# Patient Record
Sex: FEMALE | Race: WHITE | NOT HISPANIC OR LATINO | Employment: UNEMPLOYED | RURAL
[De-identification: names, ages, dates, MRNs, and addresses within clinical notes are randomized per-mention and may not be internally consistent; named-entity substitution may affect disease eponyms.]

---

## 2017-05-30 ENCOUNTER — OFFICE VISIT (OUTPATIENT)
Dept: CARDIOLOGY | Facility: CLINIC | Age: 82
End: 2017-05-30

## 2017-05-30 VITALS
WEIGHT: 190 LBS | BODY MASS INDEX: 34.96 KG/M2 | HEART RATE: 70 BPM | DIASTOLIC BLOOD PRESSURE: 76 MMHG | SYSTOLIC BLOOD PRESSURE: 127 MMHG | HEIGHT: 62 IN

## 2017-05-30 DIAGNOSIS — I48.21 PERMANENT ATRIAL FIBRILLATION (HCC): ICD-10-CM

## 2017-05-30 DIAGNOSIS — I20.9 ANGINA PECTORIS (HCC): Primary | ICD-10-CM

## 2017-05-30 DIAGNOSIS — I10 ESSENTIAL HYPERTENSION: ICD-10-CM

## 2017-05-30 DIAGNOSIS — I49.5 SSS (SICK SINUS SYNDROME) (HCC): ICD-10-CM

## 2017-05-30 PROCEDURE — 93288 INTERROG EVL PM/LDLS PM IP: CPT | Performed by: INTERNAL MEDICINE

## 2017-05-30 PROCEDURE — 99214 OFFICE O/P EST MOD 30 MIN: CPT | Performed by: INTERNAL MEDICINE

## 2017-05-30 RX ORDER — METOPROLOL SUCCINATE 25 MG/1
25 TABLET, EXTENDED RELEASE ORAL DAILY
Qty: 90 TABLET | Refills: 3 | Status: SHIPPED | OUTPATIENT
Start: 2017-05-30 | End: 2018-08-21

## 2017-05-30 RX ORDER — ISOSORBIDE MONONITRATE 30 MG/1
30 TABLET, EXTENDED RELEASE ORAL DAILY
Qty: 90 TABLET | Refills: 3 | Status: SHIPPED | OUTPATIENT
Start: 2017-05-30

## 2017-05-30 RX ORDER — FUROSEMIDE 20 MG/1
20 TABLET ORAL DAILY
Qty: 90 TABLET | Refills: 3 | Status: SHIPPED | OUTPATIENT
Start: 2017-05-30

## 2017-05-30 RX ORDER — DILTIAZEM HYDROCHLORIDE 120 MG/1
120 TABLET, FILM COATED ORAL DAILY
Qty: 90 TABLET | Refills: 3 | Status: SHIPPED | OUTPATIENT
Start: 2017-05-30

## 2017-09-05 ENCOUNTER — OFFICE VISIT (OUTPATIENT)
Dept: CARDIOLOGY | Facility: CLINIC | Age: 82
End: 2017-09-05

## 2017-09-05 VITALS
HEIGHT: 62 IN | SYSTOLIC BLOOD PRESSURE: 122 MMHG | DIASTOLIC BLOOD PRESSURE: 74 MMHG | HEART RATE: 63 BPM | WEIGHT: 201 LBS | BODY MASS INDEX: 36.99 KG/M2

## 2017-09-05 DIAGNOSIS — I10 ESSENTIAL HYPERTENSION: ICD-10-CM

## 2017-09-05 DIAGNOSIS — I49.5 SSS (SICK SINUS SYNDROME) (HCC): Primary | ICD-10-CM

## 2017-09-05 PROCEDURE — 99213 OFFICE O/P EST LOW 20 MIN: CPT | Performed by: INTERNAL MEDICINE

## 2017-09-05 NOTE — PROGRESS NOTES
Encounter Date:09/05/2017      Patient ID: Jennifer Cline is a 89 y.o. female.    Chief Complaint: Atrial Fibrillation    PROBLEM LIST:  1. Probable anginal pectoris:  a. Reported history of previous negative cardiac catheterization, incomplete database.  b. Off-and-on chest discomfort, fairly stable on current dose of Imdur.  c. Refuses further cardiac evaluation at this time, 03/13/2012.  d. Echocardiogram, 01/12/2012, was technically a limited study and showed mild LV dysfunction. Accurate assessment could not be made.  e. Symptoms improved after discontinuation of carbonated beverages.  2. Sick sinus syndrome:  a. History of resting bradycardia.  b. Subsequent rapid atrial fibrillation.  c. Transesophageal echocardiogram followed by electrical cardioversion by Dr. Tee, 03/05/2008.  d. Subsequent junctional bradycardia.  e. Dual-chamber St. Saji pacemaker implant, 03/05/2008.  f. Recent hospitalization with rapid atrial fibrillation and congestive heart failure.  g. Coumadin discontinued due to frequent falls and extensive facial trauma and hematoma.  3. Hypertension.  4. Peptic ulcer disease/gastroesophageal reflux disease.  5. Probable history of angina.  6. Medical noncompliance.  7. Obesity, BMI 34.    History of Present Illness  Patient presents today for follow-up with a history of angina pectoris, SSS and cardiac risk factors. Doing well from a cardiac standpoint. Relative notes that she sleeps more often than usual but has no limitations of activities when awake. Denies chest pain, shortness of breath, leg swelling, palpitations, and syncope. Remains busy and active with her daily activities and no limitations.    Allergies   Allergen Reactions   • Excedrin Back & [Acetaminophen-Aspirin Buffered]          Current Outpatient Prescriptions:   •  citalopram (CeleXA) 20 MG tablet, Take 20 mg by mouth Daily., Disp: , Rfl:   •  diltiaZEM (CARDIZEM) 120 MG tablet, Take 1 tablet by mouth Daily.,  "Disp: 90 tablet, Rfl: 3  •  furosemide (LASIX) 20 MG tablet, Take 1 tablet by mouth Daily., Disp: 90 tablet, Rfl: 3  •  isosorbide mononitrate (IMDUR) 30 MG 24 hr tablet, Take 1 tablet by mouth Daily., Disp: 90 tablet, Rfl: 3  •  levothyroxine (SYNTHROID, LEVOTHROID) 50 MCG tablet, Take 50 mcg by mouth Daily., Disp: , Rfl:   •  metoprolol succinate XL (TOPROL-XL) 25 MG 24 hr tablet, Take 1 tablet by mouth Daily., Disp: 90 tablet, Rfl: 3    The following portions of the patient's history were reviewed and updated as appropriate: allergies, current medications, past family history, past medical history, past social history, past surgical history and problem list.    ROS  Review of Systems   Constitution: Negative for chills, fever, weight gain and weight loss.   Cardiovascular: Negative for chest pain, claudication, dyspnea on exertion, leg swelling, orthopnea, palpitations, paroxysmal nocturnal dyspnea and syncope.        No dizziness   Gastrointestinal: Negative for abdominal pain, constipation, diarrhea, nausea and vomiting.   Genitourinary:        No urinary symptoms   Neurological:        No symptoms of stroke.   All other systems reviewed and are negative.    Objective:     Blood pressure 122/74, pulse 63, height 62\" (157.5 cm), weight 201 lb (91.2 kg).      Physical Exam  Constitutional: She appears well-developed and well-nourished.   HENT:   HEENT exam unremarkable.   Neck: Neck supple. No JVD present.   No carotid bruits.   Cardiovascular: Normal rate, regular rhythm and normal heart sounds.    No murmur heard.  2 plus symmetric pulses.   Pulmonary/Chest: Breath sounds normal. Does not exhibit tenderness.   Abdominal:   Abdomen benign.   Musculoskeletal: Does not exhibit edema.   Neurological:   Neurological exam unremarkable.   Vitals reviewed.    Procedures   Device interrogation shows normal VVI pacemaker function, device has reached DYAN      Assessment:      Diagnosis Plan   1. SSS (sick sinus syndrome), " Asymptomatic, battery at end of life  Generator change    2. Essential hypertension Controlled       Plan:   Scheduled for a generator change.  Stable cardiac status.  Continue current medications.   FU After procedure.  Thank you for allowing us to participate in the care of your patient.     Fior Tee MD, FACC, [facility]        Please note that portions of this note may have been completed with a voice recognition program. Efforts were made to edit the dictations, but occasionally words are mistranscribed.

## 2017-09-08 ENCOUNTER — PREP FOR SURGERY (OUTPATIENT)
Dept: OTHER | Facility: HOSPITAL | Age: 82
End: 2017-09-08

## 2017-09-08 DIAGNOSIS — I49.5 SSS (SICK SINUS SYNDROME) (HCC): Primary | ICD-10-CM

## 2017-09-08 DIAGNOSIS — Z45.010 PACEMAKER BATTERY DEPLETION: ICD-10-CM

## 2017-09-08 DIAGNOSIS — Z45.010 PACEMAKER AT END OF BATTERY LIFE: Primary | ICD-10-CM

## 2017-09-08 RX ORDER — ONDANSETRON 2 MG/ML
4 INJECTION INTRAMUSCULAR; INTRAVENOUS EVERY 6 HOURS PRN
Status: CANCELLED | OUTPATIENT
Start: 2017-09-08

## 2017-09-08 RX ORDER — NITROGLYCERIN 0.4 MG/1
0.4 TABLET SUBLINGUAL
Status: CANCELLED | OUTPATIENT
Start: 2017-09-08

## 2017-09-08 RX ORDER — VANCOMYCIN HYDROCHLORIDE 1 G/200ML
1000 INJECTION, SOLUTION INTRAVENOUS
Status: CANCELLED | OUTPATIENT
Start: 2017-09-08

## 2017-09-08 RX ORDER — SODIUM CHLORIDE 0.9 % (FLUSH) 0.9 %
1-10 SYRINGE (ML) INJECTION AS NEEDED
Status: CANCELLED | OUTPATIENT
Start: 2017-09-08

## 2017-09-08 RX ORDER — ACETAMINOPHEN 325 MG/1
650 TABLET ORAL EVERY 4 HOURS PRN
Status: CANCELLED | OUTPATIENT
Start: 2017-09-08

## 2017-09-17 ENCOUNTER — APPOINTMENT (OUTPATIENT)
Dept: PREADMISSION TESTING | Facility: HOSPITAL | Age: 82
End: 2017-09-17

## 2017-09-17 LAB
DEPRECATED RDW RBC AUTO: 47.3 FL (ref 37–54)
ERYTHROCYTE [DISTWIDTH] IN BLOOD BY AUTOMATED COUNT: 13.8 % (ref 11.3–14.5)
HCT VFR BLD AUTO: 45.5 % (ref 34.5–44)
HGB BLD-MCNC: 14.9 G/DL (ref 11.5–15.5)
MCH RBC QN AUTO: 30.5 PG (ref 27–31)
MCHC RBC AUTO-ENTMCNC: 32.7 G/DL (ref 32–36)
MCV RBC AUTO: 93.2 FL (ref 80–99)
PLATELET # BLD AUTO: 282 10*3/MM3 (ref 150–450)
PMV BLD AUTO: 10 FL (ref 6–12)
RBC # BLD AUTO: 4.88 10*6/MM3 (ref 3.89–5.14)
WBC NRBC COR # BLD: 7.99 10*3/MM3 (ref 3.5–10.8)

## 2017-09-17 PROCEDURE — 85027 COMPLETE CBC AUTOMATED: CPT | Performed by: PHYSICIAN ASSISTANT

## 2017-09-17 NOTE — PAT
NO ORDER IN [facility] FOR CONSENT.  SIGN DOS.      GREEN TOP HEMALYZED.  PT. LEFT.  REDRAW DOS.

## 2017-09-17 NOTE — DISCHARGE INSTRUCTIONS
The following instructions given during Pre Admission Testing visit:    Do not eat or drink anything after MN except for sips of water with your a.m. Prescription meds unless otherwise instructed by your physician.    Glasses and jewelry may be worn, but dentures must be removed prior to cath/procedure.    Leave any items you consider valuable at home.    Family members may wait in [facility] waiting area during procedure.    Bring all medications in their original containers the day of procedure.    Bring photo ID and insurance cards on the day of procedure.    Need to make arrangements for transportation prior to discharge.    The following handouts were given:     Heart Cath pathway (if applicable)   Cardiac Cath booklet published by [employer]    OR appropriate [facility] procedure booklet    If applicable, pt instructed to bring CPAP mask and tubing the day of procedure.

## 2017-09-18 ENCOUNTER — HOSPITAL ENCOUNTER (OUTPATIENT)
Facility: HOSPITAL | Age: 82
Setting detail: HOSPITAL OUTPATIENT SURGERY
Discharge: HOME | End: 2017-09-18
Attending: INTERNAL MEDICINE | Admitting: INTERNAL MEDICINE

## 2017-09-18 VITALS
DIASTOLIC BLOOD PRESSURE: 73 MMHG | TEMPERATURE: 97 F | OXYGEN SATURATION: 93 % | HEART RATE: 72 BPM | RESPIRATION RATE: 18 BRPM | BODY MASS INDEX: 36.47 KG/M2 | WEIGHT: 198.19 LBS | HEIGHT: 62 IN | SYSTOLIC BLOOD PRESSURE: 141 MMHG

## 2017-09-18 DIAGNOSIS — I49.5 SSS (SICK SINUS SYNDROME) (HCC): ICD-10-CM

## 2017-09-18 DIAGNOSIS — Z45.010 PACEMAKER BATTERY DEPLETION: ICD-10-CM

## 2017-09-18 PROCEDURE — C1786 PMKR, SINGLE, RATE-RESP: HCPCS | Performed by: INTERNAL MEDICINE

## 2017-09-18 PROCEDURE — 25010000002 MIDAZOLAM PER 1 MG: Performed by: INTERNAL MEDICINE

## 2017-09-18 PROCEDURE — 33227 REMOVE&REPLACE PM GEN SINGL: CPT | Performed by: INTERNAL MEDICINE

## 2017-09-18 PROCEDURE — 25010000002 FENTANYL CITRATE (PF) 100 MCG/2ML SOLUTION: Performed by: INTERNAL MEDICINE

## 2017-09-18 PROCEDURE — 25010000002 VANCOMYCIN PER 500 MG: Performed by: PHYSICIAN ASSISTANT

## 2017-09-18 DEVICE — GEN PM ASSURITY SR RF PM1272: Type: IMPLANTABLE DEVICE | Status: FUNCTIONAL

## 2017-09-18 RX ORDER — NITROGLYCERIN 0.4 MG/1
0.4 TABLET SUBLINGUAL
Status: DISCONTINUED | OUTPATIENT
Start: 2017-09-18 | End: 2017-09-18 | Stop reason: HOSPADM

## 2017-09-18 RX ORDER — FENTANYL CITRATE 50 UG/ML
INJECTION, SOLUTION INTRAMUSCULAR; INTRAVENOUS AS NEEDED
Status: DISCONTINUED | OUTPATIENT
Start: 2017-09-18 | End: 2017-09-18 | Stop reason: HOSPADM

## 2017-09-18 RX ORDER — SODIUM CHLORIDE 0.9 % (FLUSH) 0.9 %
1-10 SYRINGE (ML) INJECTION AS NEEDED
Status: DISCONTINUED | OUTPATIENT
Start: 2017-09-18 | End: 2017-09-18 | Stop reason: HOSPADM

## 2017-09-18 RX ORDER — VANCOMYCIN HYDROCHLORIDE 1 G/200ML
1000 INJECTION, SOLUTION INTRAVENOUS
Status: COMPLETED | OUTPATIENT
Start: 2017-09-18 | End: 2017-09-18

## 2017-09-18 RX ORDER — ONDANSETRON 2 MG/ML
4 INJECTION INTRAMUSCULAR; INTRAVENOUS EVERY 6 HOURS PRN
Status: DISCONTINUED | OUTPATIENT
Start: 2017-09-18 | End: 2017-09-18 | Stop reason: HOSPADM

## 2017-09-18 RX ORDER — ACETAMINOPHEN 325 MG/1
650 TABLET ORAL EVERY 4 HOURS PRN
Status: DISCONTINUED | OUTPATIENT
Start: 2017-09-18 | End: 2017-09-18 | Stop reason: HOSPADM

## 2017-09-18 RX ORDER — MIDAZOLAM HYDROCHLORIDE 1 MG/ML
INJECTION INTRAMUSCULAR; INTRAVENOUS AS NEEDED
Status: DISCONTINUED | OUTPATIENT
Start: 2017-09-18 | End: 2017-09-18 | Stop reason: HOSPADM

## 2017-09-18 RX ORDER — LIDOCAINE HYDROCHLORIDE 10 MG/ML
INJECTION, SOLUTION INFILTRATION; PERINEURAL AS NEEDED
Status: DISCONTINUED | OUTPATIENT
Start: 2017-09-18 | End: 2017-09-18 | Stop reason: HOSPADM

## 2017-09-18 RX ADMIN — VANCOMYCIN HYDROCHLORIDE 1000 MG: 1 INJECTION, SOLUTION INTRAVENOUS at 08:32

## 2017-09-18 NOTE — PLAN OF CARE
Problem: Cardiac Rhythm Management Device (Adult)  Goal: Signs and Symptoms of Listed Potential Problems Will be Absent or Manageable (Cardiac Rhythm Management Device)  Outcome: Ongoing (interventions implemented as appropriate)    09/18/17 4052   Cardiac Rhythm Management Device   Problems Assessed (Cardiac Rhythm Management Device) all   Problems Present (Cardiac Rhythm Management Device) none

## 2017-09-18 NOTE — INTERVAL H&P NOTE
H&P reviewed. The patient was examined and there are no changes to the H&P.    Pre-cardiac Catheterization Report  Cardiovascular Laboratory  [place]    Patient:  Jennifer Cline  :  1926  PCP:  Higinio Kenny MD  PHONE:  [phone]    DATE: 2017      MEDICATIONS:  Prior to Admission medications    Medication Sig Start Date End Date Taking? Authorizing Provider   citalopram (CeleXA) 20 MG tablet Take 10 mg by mouth Daily.   Yes Historical Provider, MD   diltiaZEM (CARDIZEM) 120 MG tablet Take 1 tablet by mouth Daily. 17  Yes JACOB Sanchez   furosemide (LASIX) 20 MG tablet Take 1 tablet by mouth Daily. 17  Yes JACOB Sanchez   isosorbide mononitrate (IMDUR) 30 MG 24 hr tablet Take 1 tablet by mouth Daily. 17  Yes JACOB Sanchez   levothyroxine (SYNTHROID, LEVOTHROID) 50 MCG tablet Take 50 mcg by mouth Daily.   Yes Historical Provider, MD   metoprolol succinate XL (TOPROL-XL) 25 MG 24 hr tablet Take 1 tablet by mouth Daily. 17  Yes JACOB Sanchez       Past medical & surgical history, social and family history reviewed in the electronic medical record.    Physical Exam:    Vitals:   Vitals:    17 0650   BP: 179/83   Pulse: 65   Resp: 20   Temp: 97 °F (36.1 °C)   SpO2: 92%    Last Weight    17  0650   Weight: 198 lb 3.1 oz (89.9 kg)   Body mass index is 36.25 kg/(m^2).    GENERAL: No apparent distress.  No significant changes since last exam.  CHEST: Clear to auscultation bilaterally no stridor no wheeze.  CV: S1, S2, Regular without Murmurs, Rubs or Gallops  EXTREMITIES: No edema.              Results from last 7 days  Lab Units 17  1057   WBC 10*3/mm3 7.99   HEMOGLOBIN g/dL 14.9   HEMATOCRIT % 45.5*   PLATELETS 10*3/mm3 282     No results found for: CHLPL, TRIG, HDL, LDLDIRECT, AST, ALT        IMPRESSION:PPM at DYAN    PLAN:  · PPM Gen change     I have seen and examined the patient, case was discussed with the  physician extender, reviewed the above note, necessary changes were made and I agree with the final note.   Fior Tee MD, FACC, [facility]

## 2017-09-26 ENCOUNTER — OFFICE VISIT (OUTPATIENT)
Dept: CARDIOLOGY | Facility: CLINIC | Age: 82
End: 2017-09-26

## 2017-09-26 DIAGNOSIS — I49.5 SSS (SICK SINUS SYNDROME) (HCC): Primary | ICD-10-CM

## 2017-09-26 PROCEDURE — 99024 POSTOP FOLLOW-UP VISIT: CPT | Performed by: INTERNAL MEDICINE

## 2017-09-26 NOTE — PROGRESS NOTES
WOUND CHECK    2017    Jennifer Cline, : 1926    Fior Tee MD    B/P:  (Sitting)  (Standing)130/72    Pulse: 77    Patient has fever: [] Temperature if indicated:     Wound Location: left infraclavicular    Dressing Removed [x]        Old Dressing Appearance:  Clean, dry [x]                 Old, bloody drainage []                            Moist, serous drainage []                Moist, thick yellow/green drainage []       Wound Appearance: Redness []                  Drainage []                  Culture obtained []        Color: N/A     Consistency: none     Amount: none         Gloves used, wound cleansed with sterile 4x4 and peroxide [x]       MD notified [] MD orders:     Antibiotic started []  If checked, type   Other:     Appointment for follow-up scheduled for 3 months post procedure []    Future Appointments  Date Time Provider Department Center   2018 11:00 AM Fior Tee MD [facility] None           Graciela Neely MA, 17      MD Signature:______________________________ Completed By/Date:

## 2017-10-16 ENCOUNTER — TELEPHONE (OUTPATIENT)
Dept: CARDIOLOGY | Facility: CLINIC | Age: 82
End: 2017-10-16

## 2017-10-16 NOTE — TELEPHONE ENCOUNTER
Called pt due to [employer] home monitoring not reading.  Spoke to daughter and she is going to move box to living room.  Will check for connection tomorrow.

## 2018-02-06 ENCOUNTER — OFFICE VISIT (OUTPATIENT)
Dept: CARDIOLOGY | Facility: CLINIC | Age: 83
End: 2018-02-06

## 2018-02-06 VITALS
HEIGHT: 62 IN | BODY MASS INDEX: 35.33 KG/M2 | HEART RATE: 70 BPM | DIASTOLIC BLOOD PRESSURE: 86 MMHG | SYSTOLIC BLOOD PRESSURE: 138 MMHG | WEIGHT: 192 LBS

## 2018-02-06 DIAGNOSIS — I10 ESSENTIAL HYPERTENSION: ICD-10-CM

## 2018-02-06 DIAGNOSIS — I48.21 PERMANENT ATRIAL FIBRILLATION (HCC): Primary | ICD-10-CM

## 2018-02-06 DIAGNOSIS — I49.5 SSS (SICK SINUS SYNDROME) (HCC): ICD-10-CM

## 2018-02-06 PROBLEM — Z45.010 PACEMAKER BATTERY DEPLETION: Status: RESOLVED | Noted: 2017-09-08 | Resolved: 2018-02-06

## 2018-02-06 PROCEDURE — 99214 OFFICE O/P EST MOD 30 MIN: CPT | Performed by: PHYSICIAN ASSISTANT

## 2018-02-06 PROCEDURE — 93279 PRGRMG DEV EVAL PM/LDLS PM: CPT | Performed by: PHYSICIAN ASSISTANT

## 2018-02-06 RX ORDER — LISINOPRIL 10 MG/1
10 TABLET ORAL DAILY
COMMUNITY
End: 2018-08-21

## 2018-02-06 RX ORDER — LEVOTHYROXINE SODIUM 0.07 MG/1
75 TABLET ORAL DAILY
COMMUNITY

## 2018-02-06 RX ORDER — DIGOXIN 125 MCG
125 TABLET ORAL
COMMUNITY
End: 2018-08-21

## 2018-02-06 NOTE — PROGRESS NOTES
Subjective:     Encounter Date:02/06/2018      Patient ID: Jennifer Cline is a 89 y.o. female.    Higinio Kenny MD      Chief Complaint: follow up for Atrial Fibrilation      Problem   Angina Pectoris    a. Reported history of previous negative cardiac catheterization, incomplete database.  b. Off-and-on chest discomfort, fairly stable on current dose of Imdur.  c. Refuses further cardiac evaluation at this time, 03/13/2012.  d. Echocardiogram, 01/12/2012, was technically a limited study and showed mild LV dysfunction.  Accurate assessment could not be made. Symptoms improved after discontinuation of carbonated beverages.     Hypertension   Sss (Sick Sinus Syndrome)    History of resting bradycardia.  Subsequent rapid atrial fibrillation.  Transesophageal echocardiogram followed by electrical cardioversion by Dr. Tee, 03/05/2008.  Subsequent junctional bradycardia.  Dual-chamber St. Saji pacemaker implant, 03/05/2008.  Recent hospitalization with rapid atrial fibrillation and congestive heart failure.  Coumadin discontinued due to frequent falls and extensive facial trauma and hematoma.  Gen change 9-18-17: St Saji     Pacemaker Battery Depletion (Resolved)    Added automatically from request for surgery [id number]         Patient Active Problem List   Diagnosis   • Angina pectoris   • SSS (sick sinus syndrome)   • Hypertension   • GERD (gastroesophageal reflux disease)   • Medically noncompliant   • Obesity   • Permanent atrial fibrillation         History of Present Illness  She returns today for initial follow-up after generator change.  She has no complaint exertional chest pain or dyspnea, and orthopnea no PND.  No dizziness or syncope.  Daughter occasionally checks her blood pressure at home and it generally runs 120s to 130s systolic.  She is compliant with her medications reports no significant side effects.  She is unaware of her rate and rhythm.              Allergies   Allergen Reactions   •  "Excedrin Back & [Acetaminophen-Aspirin Buffered] Other (See Comments)     UNKNOWN BY PATIENT         Current Outpatient Prescriptions:   •  digoxin (LANOXIN) 125 MCG tablet, Take 125 mcg by mouth Daily., Disp: , Rfl:   •  diltiaZEM (CARDIZEM) 120 MG tablet, Take 1 tablet by mouth Daily. (Patient taking differently: Take 240 mg by mouth Daily.), Disp: 90 tablet, Rfl: 3  •  furosemide (LASIX) 20 MG tablet, Take 1 tablet by mouth Daily., Disp: 90 tablet, Rfl: 3  •  isosorbide mononitrate (IMDUR) 30 MG 24 hr tablet, Take 1 tablet by mouth Daily., Disp: 90 tablet, Rfl: 3  •  levothyroxine (SYNTHROID, LEVOTHROID) 75 MCG tablet, Take 75 mcg by mouth Daily., Disp: , Rfl:   •  lisinopril (PRINIVIL,ZESTRIL) 10 MG tablet, Take 10 mg by mouth Daily., Disp: , Rfl:   •  metoprolol tartrate (LOPRESSOR) 25 MG tablet, Take 25 mg by mouth 2 (Two) Times a Day., Disp: , Rfl:   •  citalopram (CeleXA) 20 MG tablet, Take 10 mg by mouth Daily., Disp: , Rfl:   •  levothyroxine (SYNTHROID, LEVOTHROID) 50 MCG tablet, Take 50 mcg by mouth Daily., Disp: , Rfl:   •  metoprolol succinate XL (TOPROL-XL) 25 MG 24 hr tablet, Take 1 tablet by mouth Daily. (Patient taking differently: Take 50 mg by mouth Daily.), Disp: 90 tablet, Rfl: 3             Objective:     Vitals:    02/06/18 0929   BP: 154/88   BP Location: Left arm   Patient Position: Sitting   Pulse: 70   Weight: 87.1 kg (192 lb)   Height: 157.5 cm (62\")         Physical Exam   Constitutional: She is oriented to person, place, and time. She appears well-developed and well-nourished. No distress.   HENT:   Head: Normocephalic and atraumatic.   Mouth/Throat: Oropharynx is clear and moist.   Eyes: Pupils are equal, round, and reactive to light. No scleral icterus.   Neck: Neck supple. No JVD present. No tracheal deviation present. No thyromegaly present.   Cardiovascular: Normal rate, regular rhythm and normal heart sounds.  Exam reveals no gallop and no friction rub.    No murmur " heard.  Pulmonary/Chest: Effort normal and breath sounds normal. No respiratory distress. She has no wheezes. She has no rales.   Abdominal: Soft. Bowel sounds are normal. She exhibits no distension and no mass. There is no tenderness. There is no rebound and no guarding.   Musculoskeletal: Normal range of motion. She exhibits no edema or deformity.   Lymphadenopathy:     She has no cervical adenopathy.   Neurological: She is alert and oriented to person, place, and time. No cranial nerve deficit.   Skin: Skin is warm and dry. No rash noted. She is not diaphoretic.   Psychiatric: She has a normal mood and affect.   Nursing note and vitals reviewed.      Lab Review:     Procedures   Device interrogation V paced 2.5 mV threshold is 1 V at 0.8 ms, impedance 410 ohms battery voltage 2.99 no episodes no device reprogramming changes made      Assessment:         1. Permanent atrial fibrillation    2. SSS (sick sinus syndrome)    3. Essential hypertension             Plan:                  Continue current medications.   FU in 6 MO, With St. Saji permanent pacemaker interrogation sooner as needed.  Thank you for allowing us to participate in the care of your patient.       JACOB Matthews  02/06/18  9:39 AM

## 2018-05-30 ENCOUNTER — TELEPHONE (OUTPATIENT)
Dept: CARDIOLOGY | Facility: CLINIC | Age: 83
End: 2018-05-30

## 2018-05-30 NOTE — TELEPHONE ENCOUNTER
Spoke with Ms Cline's daughter regarding the merlin monitor not transmitting. Daughter stated her mom is at her brothers house and will be back tomorrow.

## 2018-08-21 ENCOUNTER — OFFICE VISIT (OUTPATIENT)
Dept: CARDIOLOGY | Facility: CLINIC | Age: 83
End: 2018-08-21

## 2018-08-21 VITALS
OXYGEN SATURATION: 94 % | BODY MASS INDEX: 34.41 KG/M2 | WEIGHT: 187 LBS | HEART RATE: 70 BPM | DIASTOLIC BLOOD PRESSURE: 60 MMHG | SYSTOLIC BLOOD PRESSURE: 126 MMHG | HEIGHT: 62 IN

## 2018-08-21 DIAGNOSIS — I49.5 SSS (SICK SINUS SYNDROME) (HCC): Primary | ICD-10-CM

## 2018-08-21 DIAGNOSIS — I48.21 PERMANENT ATRIAL FIBRILLATION (HCC): ICD-10-CM

## 2018-08-21 DIAGNOSIS — Z95.0 PACEMAKER: ICD-10-CM

## 2018-08-21 DIAGNOSIS — I10 ESSENTIAL HYPERTENSION: ICD-10-CM

## 2018-08-21 PROCEDURE — 93288 INTERROG EVL PM/LDLS PM IP: CPT | Performed by: INTERNAL MEDICINE

## 2018-08-21 PROCEDURE — 99213 OFFICE O/P EST LOW 20 MIN: CPT | Performed by: INTERNAL MEDICINE

## 2018-08-21 RX ORDER — ATORVASTATIN CALCIUM 10 MG/1
10 TABLET, FILM COATED ORAL DAILY
COMMUNITY

## 2018-08-21 NOTE — PROGRESS NOTES
Encounter Date:08/21/2018      Patient ID: Jennifer Cline is a 89 y.o. female.    Chief Complaint: Atrial Fibrillation and sss      PROBLEM LIST:  1. Probable anginal pectoris:  a. Reported history of previous negative cardiac catheterization, incomplete database.  b. Off-and-on chest discomfort, fairly stable on current dose of Imdur.  c. Refuses further cardiac evaluation at this time, 03/13/2012.  d. Echocardiogram, 01/12/2012, was technically a limited study and showed mild LV dysfunction. Accurate assessment could not be made.  e. Symptoms improved after discontinuation of carbonated beverages.  2. Sick sinus syndrome:  a. History of resting bradycardia.  b. Subsequent rapid atrial fibrillation.  c. Transesophageal echocardiogram followed by electrical cardioversion by Dr. Tee, 03/05/2008.  d. Subsequent junctional bradycardia.  e. Dual-chamber St. Saji pacemaker implant, 03/05/2008.  f. Recent hospitalization with rapid atrial fibrillation and congestive heart failure.  g. Coumadin discontinued due to frequent falls and extensive facial trauma and hematoma.  3. Hypertension.  4. Peptic ulcer disease/gastroesophageal reflux disease.  5. Probable history of angina.  6. Medical noncompliance.  7. Obesity, BMI 34.    History of Present Illness  Patient presents today for follow-up with a history of angina, sick sinus syndrome, and cardiac risk factors. Since last visit has been doing well from a cardiac standpoint. Denies chest pain, shortness of breath, leg swelling, palpitations, and syncope. Remains busy and active with minor chores and personal care limited by cane use.    Allergies   Allergen Reactions   • Excedrin Back & [Acetaminophen-Aspirin Buffered] Other (See Comments)     UNKNOWN BY PATIENT         Current Outpatient Prescriptions:   •  aspirin 81 MG tablet, Take 81 mg by mouth Daily., Disp: , Rfl:   •  atorvastatin (LIPITOR) 10 MG tablet, Take 10 mg by mouth Daily., Disp: , Rfl:   •   "citalopram (CeleXA) 20 MG tablet, Take 10 mg by mouth Daily., Disp: , Rfl:   •  diltiaZEM (CARDIZEM) 120 MG tablet, Take 1 tablet by mouth Daily., Disp: 90 tablet, Rfl: 3  •  furosemide (LASIX) 20 MG tablet, Take 1 tablet by mouth Daily., Disp: 90 tablet, Rfl: 3  •  isosorbide mononitrate (IMDUR) 30 MG 24 hr tablet, Take 1 tablet by mouth Daily., Disp: 90 tablet, Rfl: 3  •  levothyroxine (SYNTHROID, LEVOTHROID) 75 MCG tablet, Take 75 mcg by mouth Daily., Disp: , Rfl:     The following portions of the patient's history were reviewed and updated as appropriate: allergies, current medications, past family history, past medical history, past social history, past surgical history and problem list.    ROS  Review of Systems   Constitution: Negative for chills, fever, weight gain and weight loss.   Cardiovascular: Negative for chest pain, claudication, dyspnea on exertion, leg swelling, orthopnea, palpitations, paroxysmal nocturnal dyspnea and syncope.        No dizziness   Gastrointestinal: Negative for abdominal pain, constipation, diarrhea, nausea and vomiting.   Genitourinary:        No urinary symptoms   Neurological:        No symptoms of stroke.   All other systems reviewed and are negative.    Objective:     Blood pressure 126/60, pulse 70, height 157.5 cm (62\"), weight 84.8 kg (187 lb), SpO2 94 %, not currently breastfeeding.        Physical Exam  Constitutional: She appears well-developed and well-nourished.   HENT:   HEENT exam unremarkable.   Neck: Neck supple. No JVD present.   No carotid bruits.   Cardiovascular: Normal rate, regular rhythm and normal heart sounds.    No murmur heard.  2 plus symmetric pulses.   Pulmonary/Chest: Breath sounds normal. Does not exhibit tenderness.   Abdominal:   Abdomen benign.   Musculoskeletal: Does not exhibit edema.   Neurological:   Neurological exam unremarkable.   Vitals reviewed.    Lab Review:   Procedures   Normal VVIR pacemaker function with more than 9 years of " battery life.  Assessment:      Diagnosis Plan   1. SSS (sick sinus syndrome) (CMS/HCC)  Stable    2. Permanent atrial fibrillation (CMS/HCC)     3. Pacemaker     4. Essential hypertension  Well controlled with current medication regimen     Plan:   Device check showed normal single chamber pacemaker function with more than 9 years of battery life.  Continue current medications.   FU in 6 MO with device check, sooner as needed.  Thank you for allowing us to participate in the care of your patient.     Scribed for Fior Tee MD by Patrick Donovan. 8/21/2018  1:27 PM    I, Fior Tee MD, personally performed the services described in this documentation as scribed by the above named individual in my presence, and it is both accurate and complete.  8/21/2018  1:34 PM        Please note that portions of this note may have been completed with a voice recognition program. Efforts were made to edit the dictations, but occasionally words are mistranscribed.

## 2018-10-25 ENCOUNTER — TELEPHONE (OUTPATIENT)
Dept: CARDIOLOGY | Facility: CLINIC | Age: 83
End: 2018-10-25

## 2018-10-25 NOTE — TELEPHONE ENCOUNTER
Called due to not receiving scheduled transmission.  Monitor is connecting just didn't send reading.  Spoke with daughter and she was frustrated and put out and was not at home at this time.  Instructed her to call the office for assistance in sending in a reading when she is with her mother and monitor.  She verbalized understanding.

## 2018-10-26 ENCOUNTER — CLINICAL SUPPORT NO REQUIREMENTS (OUTPATIENT)
Dept: CARDIOLOGY | Facility: CLINIC | Age: 83
End: 2018-10-26

## 2018-10-26 DIAGNOSIS — I48.21 PERMANENT ATRIAL FIBRILLATION (HCC): ICD-10-CM

## 2018-10-26 DIAGNOSIS — I49.5 SSS (SICK SINUS SYNDROME) (HCC): Primary | ICD-10-CM

## 2018-10-26 PROCEDURE — 93296 REM INTERROG EVL PM/IDS: CPT | Performed by: INTERNAL MEDICINE

## 2018-10-26 PROCEDURE — 93294 REM INTERROG EVL PM/LDLS PM: CPT | Performed by: INTERNAL MEDICINE

## 2019-01-01 ENCOUNTER — TELEPHONE (OUTPATIENT)
Dept: CARDIOLOGY | Facility: CLINIC | Age: 84
End: 2019-01-01

## 2019-01-01 ENCOUNTER — OFFICE VISIT (OUTPATIENT)
Dept: CARDIOLOGY | Facility: CLINIC | Age: 84
End: 2019-01-01

## 2019-01-01 ENCOUNTER — CLINICAL SUPPORT NO REQUIREMENTS (OUTPATIENT)
Dept: CARDIOLOGY | Facility: CLINIC | Age: 84
End: 2019-01-01

## 2019-01-01 VITALS
HEART RATE: 70 BPM | WEIGHT: 170 LBS | DIASTOLIC BLOOD PRESSURE: 66 MMHG | HEIGHT: 62 IN | BODY MASS INDEX: 31.28 KG/M2 | SYSTOLIC BLOOD PRESSURE: 117 MMHG

## 2019-01-01 DIAGNOSIS — I49.5 SSS (SICK SINUS SYNDROME) (HCC): ICD-10-CM

## 2019-01-01 DIAGNOSIS — E78.5 DYSLIPIDEMIA: ICD-10-CM

## 2019-01-01 DIAGNOSIS — I10 ESSENTIAL HYPERTENSION: ICD-10-CM

## 2019-01-01 DIAGNOSIS — I48.21 PERMANENT ATRIAL FIBRILLATION (HCC): Primary | ICD-10-CM

## 2019-01-01 PROCEDURE — 93296 REM INTERROG EVL PM/IDS: CPT | Performed by: INTERNAL MEDICINE

## 2019-01-01 PROCEDURE — 99214 OFFICE O/P EST MOD 30 MIN: CPT | Performed by: INTERNAL MEDICINE

## 2019-01-01 PROCEDURE — 93288 INTERROG EVL PM/LDLS PM IP: CPT | Performed by: INTERNAL MEDICINE

## 2019-01-01 PROCEDURE — 93294 REM INTERROG EVL PM/LDLS PM: CPT | Performed by: INTERNAL MEDICINE

## 2019-01-30 ENCOUNTER — CLINICAL SUPPORT NO REQUIREMENTS (OUTPATIENT)
Dept: CARDIOLOGY | Facility: CLINIC | Age: 84
End: 2019-01-30

## 2019-01-30 DIAGNOSIS — I49.5 SSS (SICK SINUS SYNDROME) (HCC): ICD-10-CM

## 2019-01-30 PROCEDURE — 93294 REM INTERROG EVL PM/LDLS PM: CPT | Performed by: INTERNAL MEDICINE

## 2019-01-30 PROCEDURE — 93296 REM INTERROG EVL PM/IDS: CPT | Performed by: INTERNAL MEDICINE

## 2019-03-05 ENCOUNTER — OFFICE VISIT (OUTPATIENT)
Dept: CARDIOLOGY | Facility: CLINIC | Age: 84
End: 2019-03-05

## 2019-03-05 VITALS
SYSTOLIC BLOOD PRESSURE: 120 MMHG | BODY MASS INDEX: 34.04 KG/M2 | DIASTOLIC BLOOD PRESSURE: 68 MMHG | WEIGHT: 185 LBS | HEIGHT: 62 IN | HEART RATE: 72 BPM

## 2019-03-05 DIAGNOSIS — I49.5 SSS (SICK SINUS SYNDROME) (HCC): ICD-10-CM

## 2019-03-05 DIAGNOSIS — I20.9 ANGINA PECTORIS (HCC): Primary | ICD-10-CM

## 2019-03-05 DIAGNOSIS — I48.21 PERMANENT ATRIAL FIBRILLATION (HCC): ICD-10-CM

## 2019-03-05 DIAGNOSIS — I10 ESSENTIAL HYPERTENSION: ICD-10-CM

## 2019-03-05 PROCEDURE — 93279 PRGRMG DEV EVAL PM/LDLS PM: CPT | Performed by: PHYSICIAN ASSISTANT

## 2019-03-05 PROCEDURE — 99214 OFFICE O/P EST MOD 30 MIN: CPT | Performed by: PHYSICIAN ASSISTANT

## 2019-03-05 NOTE — PROGRESS NOTES
Encounter Date:03/05/2019      Patient ID: Jennifer Cline is a 89 y.o. female.    Chief Complaint: Atrial Fibrillation and sss      PROBLEM LIST:  1. Probable angina pectoris:  a. Reported history of previous negative cardiac catheterization, incomplete database.  b. Off-and-on chest discomfort, fairly stable on current dose of Imdur.  c. Refuses further cardiac evaluation at this time, 03/13/2012.  d. Echocardiogram, 01/12/2012: Technically limited study. Mild LV dysfunction. Accurate assessment could not be made.  e. Symptoms improved after discontinuation of carbonated beverages.  2. Sick sinus syndrome:  a. H/o resting bradycardia.  b. Subsequent rapid atrial fibrillation.  c. EDD/ECV, 03/05/2008, Dr. Tee.  d. Subsequent junctional bradycardia.  e. Dual-chamber [employer] Saji pacemaker implant, 03/05/2008.  f. Recent hospitalization with rapid atrial fibrillation and CHF.  g. Coumadin discontinued due to frequent falls and extensive facial trauma and hematoma.  3. Hypertension.  4. Peptic ulcer disease/GERD.  5. Medical noncompliance.  6. Obesity, BMI 34.    History of Present Illness  Patient presents today for 6 month follow-up with a history of angina pectoris, PAF, SSS s/p PPM, and cardiac risk factors.  She returns today for 6-month scheduled follow-up.  She is accompanied by her son.  She is a poor historian and is pleasantly confused.  There is no reported chest pain or shortness of breath she denies dizziness or syncope.  Denies orthopnea, PND, claudication, lower extremity edema.  Her son states they did check her blood pressure periodically at home and it is generally about 120 systolic.  Her cholesterol is followed by primary care.    Allergies   Allergen Reactions   • Excedrin Back & [Acetaminophen-Aspirin Buffered] Other (See Comments)     UNKNOWN BY PATIENT         Current Outpatient Medications:   •  aspirin 81 MG tablet, Take 81 mg by mouth Daily., Disp: , Rfl:   •  atorvastatin (LIPITOR) 10  "MG tablet, Take 10 mg by mouth Daily., Disp: , Rfl:   •  citalopram (CeleXA) 20 MG tablet, Take 10 mg by mouth Daily., Disp: , Rfl:   •  diltiaZEM (CARDIZEM) 120 MG tablet, Take 1 tablet by mouth Daily., Disp: 90 tablet, Rfl: 3  •  furosemide (LASIX) 20 MG tablet, Take 1 tablet by mouth Daily., Disp: 90 tablet, Rfl: 3  •  isosorbide mononitrate (IMDUR) 30 MG 24 hr tablet, Take 1 tablet by mouth Daily., Disp: 90 tablet, Rfl: 3  •  levothyroxine (SYNTHROID, LEVOTHROID) 75 MCG tablet, Take 75 mcg by mouth Daily., Disp: , Rfl:     The following portions of the patient's history were reviewed and updated as appropriate: allergies, current medications, past family history, past medical history, past social history, past surgical history and problem list.    ROS  Review of Systems   Constitution: Negative for chills, fatigue, fever, generalized weakness, weight gain and weight loss.   Cardiovascular: Negative for chest pain, claudication, dyspnea on exertion, leg swelling, orthopnea, palpitations, paroxysmal nocturnal dyspnea and syncope.   Respiratory: Negative for cough, shortness of breath, snoring, and wheezing.  HENT: Negative for ear pain, nosebleeds, and tinnitus.  Gastrointestinal: Negative for abdominal pain, constipation, diarrhea, nausea and vomiting.   Genitourinary: No urinary symptoms   Neurological: Negative for dizziness, headaches, loss of balance, numbness, and symptoms of stroke.  Psychiatric: Normal mental status.     All other systems reviewed and are negative.    Objective:     /68 (BP Location: Left arm, Patient Position: Sitting)   Pulse 72   Ht 157.5 cm (62\")   Wt 83.9 kg (185 lb)   LMP  (LMP Unknown)   BMI 33.84 kg/m²          Physical Exam  Constitutional: Patient appears well-developed and well-nourished.   HENT: HEENT exam unremarkable.   Neck: Neck supple. No JVD present. No carotid bruits.   Cardiovascular: Normal rate, regular rhythm and normal heart sounds. No murmur heard.   2+ " symmetric pulses.   Pulmonary/Chest: Breath sounds normal. Does not exhibit tenderness.   Abdominal: Abdomen benign.   Musculoskeletal: Does not exhibit edema.   Neurological: Neurological exam unremarkable.   Vitals reviewed.    Lab Review:   No recent lab results available for review.     Procedures   Device interrogation [employer] single-chamber permanent pacemaker  VVIR  RV is 92% paced, R equals 2.4 mV, threshold is 1.25 V at 0.5 ms, impedance is 410 ohms  Battery voltage 2.89 V, 10.1-10.9 years reprogramming decreased RV pulse width to 0.5 ms from 0.8 ms          Assessment:      Diagnosis Plan   1. Angina pectoris (CMS/HCC)   well managed   2. SSS (sick sinus syndrome) (CMS/HCC)   normal functioning permanent pacemaker   3. Essential hypertension   well managed   4. Permanent atrial fibrillation (CMS/HCC)   anticoagulation discontinued secondary to frequent falls     Plan:       Stable cardiac status.  Continue current medications.   FU in 6 MO, sooner as needed.  Thank you for allowing us to participate in the care of your patient.     Electronically signed by JACOB Matthews, 03/05/19, 11:44 AM.      Please note that portions of this note may have been completed with a voice recognition program. Efforts were made to edit the dictations, but occasionally words are mistranscribed.

## 2019-05-02 ENCOUNTER — CLINICAL SUPPORT NO REQUIREMENTS (OUTPATIENT)
Dept: CARDIOLOGY | Facility: CLINIC | Age: 84
End: 2019-05-02

## 2019-05-02 DIAGNOSIS — I49.5 SSS (SICK SINUS SYNDROME) (HCC): ICD-10-CM

## 2019-05-02 DIAGNOSIS — I48.21 PERMANENT ATRIAL FIBRILLATION (HCC): ICD-10-CM

## 2019-05-02 PROCEDURE — 93296 REM INTERROG EVL PM/IDS: CPT | Performed by: PHYSICIAN ASSISTANT

## 2019-05-02 PROCEDURE — 93294 REM INTERROG EVL PM/LDLS PM: CPT | Performed by: PHYSICIAN ASSISTANT

## 2019-08-03 ENCOUNTER — CLINICAL SUPPORT NO REQUIREMENTS (OUTPATIENT)
Dept: CARDIOLOGY | Facility: CLINIC | Age: 84
End: 2019-08-03

## 2019-08-03 DIAGNOSIS — I48.21 PERMANENT ATRIAL FIBRILLATION (HCC): Primary | ICD-10-CM

## 2019-08-03 DIAGNOSIS — I49.5 SSS (SICK SINUS SYNDROME) (HCC): ICD-10-CM

## 2019-08-03 PROCEDURE — 93296 REM INTERROG EVL PM/IDS: CPT | Performed by: PHYSICIAN ASSISTANT

## 2019-08-03 PROCEDURE — 93294 REM INTERROG EVL PM/LDLS PM: CPT | Performed by: PHYSICIAN ASSISTANT

## 2019-09-17 NOTE — PROGRESS NOTES
[facility] Cardiology    Encounter Date:09/17/2019        Patient ID: Jennifer Cline is a 89 y.o. female.    PCP: Higinio Kenny MD     Chief Complaint: Atrial Fibrillation      PROBLEM LIST:  1. Probable angina pectoris:  a. Reported history of previous negative cardiac catheterization, incomplete database.  b. Off-and-on chest discomfort, fairly stable on current dose of Imdur.  c. Refuses further cardiac evaluation at this time, 03/13/2012.  d. Echocardiogram, 01/12/2012: Technically limited study. Mild LV dysfunction. Accurate assessment could not be made.  e. Symptoms improved after discontinuation of carbonated beverages.  2. Sick sinus syndrome:  a. H/o resting bradycardia.  b. Subsequent rapid atrial fibrillation.  c. EDD/ECV, 03/05/2008, Dr. Tee.  d. Subsequent junctional bradycardia.  e. Dual-chamber [employer] Saji pacemaker implant, 03/05/2008.  f. Recent hospitalization with rapid atrial fibrillation and CHF.  g. Coumadin discontinued due to frequent falls and extensive facial trauma and hematoma.  h. Pacemaker generator change, 09/18/2017: [employer] Saji medical model number [id number] serial number [id number].  VVIR .  3. Hypertension.  4. Peptic ulcer disease/GERD.  5. Medical noncompliance.  6. Obesity, BMI 34.    History of Present Illness  Patient presents today for 6 month follow-up with a history of sick sinus syndrome s/p pacemaker and cardiac risk factors. Since last visit, she has been feeling well overall from a cardiovascular standpoint. She occasionally has to use her home oxygen for shortness of breath. Denies chest pain, leg swelling, palpitations, and syncope. Remains busy with personal care activities with limitations due to wheelchair and cane use.    Allergies   Allergen Reactions   • Excedrin Back & [Acetaminophen-Aspirin Buffered] Other (See Comments)     UNKNOWN BY PATIENT         Current Outpatient Medications:   •  aspirin 81 MG tablet, Take 81 mg by mouth  "Daily., Disp: , Rfl:   •  atorvastatin (LIPITOR) 10 MG tablet, Take 10 mg by mouth Daily., Disp: , Rfl:   •  diltiaZEM (CARDIZEM) 120 MG tablet, Take 1 tablet by mouth Daily., Disp: 90 tablet, Rfl: 3  •  furosemide (LASIX) 20 MG tablet, Take 1 tablet by mouth Daily., Disp: 90 tablet, Rfl: 3  •  isosorbide mononitrate (IMDUR) 30 MG 24 hr tablet, Take 1 tablet by mouth Daily., Disp: 90 tablet, Rfl: 3  •  levothyroxine (SYNTHROID, LEVOTHROID) 75 MCG tablet, Take 75 mcg by mouth Daily., Disp: , Rfl:   •  metoprolol tartrate (LOPRESSOR) 25 MG tablet, Take 25 mg by mouth 2 (Two) Times a Day., Disp: , Rfl:     The following portions of the patient's history were reviewed and updated as appropriate: allergies, current medications, past family history, past medical history, past social history, past surgical history and problem list.    ROS  Review of Systems   Constitution: Negative for chills, fatigue, fever, generalized weakness, weight gain and weight loss.   Cardiovascular: Negative for chest pain, claudication, dyspnea on exertion, leg swelling, orthopnea, palpitations, paroxysmal nocturnal dyspnea and syncope.   Respiratory: Negative for cough and wheezing. Positive for shortness of breath  HENT: Negative for ear pain, nosebleeds, and tinnitus.  Gastrointestinal: Negative for abdominal pain, constipation, diarrhea, nausea and vomiting.   Genitourinary: No urinary symptoms   Musculoskeletal: Negative for muscle cramps. Positive for wheelchair and cane use due to falls.  Neurological: Negative for dizziness, headaches, loss of balance, numbness, and symptoms of stroke.  Psychiatric: Normal mental status.     All other systems reviewed and are negative.    Objective:     /66 (BP Location: Left arm, Patient Position: Sitting)   Pulse 70   Ht 157.5 cm (62\")   Wt 77.1 kg (170 lb)   LMP  (LMP Unknown)   BMI 31.09 kg/m²        Physical Exam  Constitutional: Patient appears well-developed and well-nourished.   HENT: " HEENT exam unremarkable.   Neck: Neck supple. No JVD present. No carotid bruits.   Cardiovascular:  Regular rate and rhythm, normal heart sounds. No murmur heard.   2+ symmetric pulses.   Pulmonary/Chest: Breath sounds normal. Does not exhibit tenderness.   Abdominal: Abdomen benign.   Musculoskeletal: Does not exhibit edema.   Neurological: Neurological exam unremarkable.   Vitals reviewed.    Lab Review:      Procedures     Manual device interrogation, 09/17/19: Normal, well-functioning [employer] Saji pacemaker with 10.1-10.7 years of battery life remaining. Events: None recorded. Underlying jorge 38-40, 96% RV paced.       Assessment:      Diagnosis Plan   1. Permanent atrial fibrillation (CMS/HCC)  Not on a blood thinner due to frequent falls.   2. SSS (sick sinus syndrome) (CMS/HCC)  Normal pacemaker, repeat interrogation in 6 months.   3. Essential hypertension  Well-controlled, continue current medications.   4. Dyslipidemia  Monitored by PCP, continue atorvastatin 10 mg.     Plan:   Stable cardiac status.  Continue current medications.   FU in 6 MO with device check, sooner as needed.  Thank you for allowing us to participate in the care of your patient.     Scribed for Fior Tee MD by Lisa Harrell. 9/17/2019  11:26 AM      I, Fior Tee MD, personally performed the services described in this documentation as scribed by the above named individual in my presence, and it is both accurate and complete.  9/17/2019  11:36 AM        Please note that portions of this note may have been completed with a voice recognition program. Efforts were made to edit the dictations, but occasionally words are mistranscribed.

## 2019-11-06 NOTE — TELEPHONE ENCOUNTER
Called pt due to [facility] home monitor didn't send in scheduled reading.  Daughter seemed put out and Mrs Cline was 10ft away from monitor and was taking a nap.

## 2020-01-01 ENCOUNTER — CLINICAL SUPPORT NO REQUIREMENTS (OUTPATIENT)
Dept: CARDIOLOGY | Facility: CLINIC | Age: 85
End: 2020-01-01

## 2020-01-01 ENCOUNTER — TELEPHONE (OUTPATIENT)
Dept: CARDIOLOGY | Facility: CLINIC | Age: 85
End: 2020-01-01

## 2020-01-01 DIAGNOSIS — I49.5 SSS (SICK SINUS SYNDROME) (HCC): ICD-10-CM

## 2020-01-01 PROCEDURE — 93294 REM INTERROG EVL PM/LDLS PM: CPT | Performed by: INTERNAL MEDICINE

## 2020-01-01 PROCEDURE — 93296 REM INTERROG EVL PM/IDS: CPT | Performed by: INTERNAL MEDICINE

## 2020-01-16 NOTE — H&P (VIEW-ONLY)
Encounter Date:09/05/2017      Patient ID: Jennifer Cline is a 89 y.o. female.    Chief Complaint: Atrial Fibrillation    PROBLEM LIST:  1. Probable anginal pectoris:  a. Reported history of previous negative cardiac catheterization, incomplete database.  b. Off-and-on chest discomfort, fairly stable on current dose of Imdur.  c. Refuses further cardiac evaluation at this time, 03/13/2012.  d. Echocardiogram, 01/12/2012, was technically a limited study and showed mild LV dysfunction. Accurate assessment could not be made.  e. Symptoms improved after discontinuation of carbonated beverages.  2. Sick sinus syndrome:  a. History of resting bradycardia.  b. Subsequent rapid atrial fibrillation.  c. Transesophageal echocardiogram followed by electrical cardioversion by Dr. Tee, 03/05/2008.  d. Subsequent junctional bradycardia.  e. Dual-chamber [employer] Saji pacemaker implant, 03/05/2008.  f. Recent hospitalization with rapid atrial fibrillation and congestive heart failure.  g. Coumadin discontinued due to frequent falls and extensive facial trauma and hematoma.  3. Hypertension.  4. Peptic ulcer disease/gastroesophageal reflux disease.  5. Probable history of angina.  6. Medical noncompliance.  7. Obesity, BMI 34.    History of Present Illness  Patient presents today for follow-up with a history of angina pectoris, SSS and cardiac risk factors. Doing well from a cardiac standpoint. Relative notes that she sleeps more often than usual but has no limitations of activities when awake. Denies chest pain, shortness of breath, leg swelling, palpitations, and syncope. Remains busy and active with her daily activities and no limitations.    Allergies   Allergen Reactions   • Excedrin Back & [Acetaminophen-Aspirin Buffered]          Current Outpatient Prescriptions:   •  citalopram (CeleXA) 20 MG tablet, Take 20 mg by mouth Daily., Disp: , Rfl:   •  diltiaZEM (CARDIZEM) 120 MG tablet, Take 1 tablet by mouth Daily.,  New Patient Visit Note       Active Problems      1.  Anal fistula        Chief Complaint   Patient presents with:  Anal Problem: Slight discomfort and bleeding      History of Present Illness   Silvina Caldwell is a 32year old female who presents for "Disp: 90 tablet, Rfl: 3  •  furosemide (LASIX) 20 MG tablet, Take 1 tablet by mouth Daily., Disp: 90 tablet, Rfl: 3  •  isosorbide mononitrate (IMDUR) 30 MG 24 hr tablet, Take 1 tablet by mouth Daily., Disp: 90 tablet, Rfl: 3  •  levothyroxine (SYNTHROID, LEVOTHROID) 50 MCG tablet, Take 50 mcg by mouth Daily., Disp: , Rfl:   •  metoprolol succinate XL (TOPROL-XL) 25 MG 24 hr tablet, Take 1 tablet by mouth Daily., Disp: 90 tablet, Rfl: 3    The following portions of the patient's history were reviewed and updated as appropriate: allergies, current medications, past family history, past medical history, past social history, past surgical history and problem list.    ROS  Review of Systems   Constitution: Negative for chills, fever, weight gain and weight loss.   Cardiovascular: Negative for chest pain, claudication, dyspnea on exertion, leg swelling, orthopnea, palpitations, paroxysmal nocturnal dyspnea and syncope.        No dizziness   Gastrointestinal: Negative for abdominal pain, constipation, diarrhea, nausea and vomiting.   Genitourinary:        No urinary symptoms   Neurological:        No symptoms of stroke.   All other systems reviewed and are negative.    Objective:     Blood pressure 122/74, pulse 63, height 62\" (157.5 cm), weight 201 lb (91.2 kg).      Physical Exam  Constitutional: She appears well-developed and well-nourished.   HENT:   HEENT exam unremarkable.   Neck: Neck supple. No JVD present.   No carotid bruits.   Cardiovascular: Normal rate, regular rhythm and normal heart sounds.    No murmur heard.  2 plus symmetric pulses.   Pulmonary/Chest: Breath sounds normal. Does not exhibit tenderness.   Abdominal:   Abdomen benign.   Musculoskeletal: Does not exhibit edema.   Neurological:   Neurological exam unremarkable.   Vitals reviewed.    Procedures   Device interrogation shows normal VVI pacemaker function, device has reached DYAN      Assessment:      Diagnosis Plan   1. SSS (sick sinus syndrome), " family history. Social History    Tobacco Use      Smoking status: Never Smoker      Smokeless tobacco: Never Used    Alcohol use:  Yes      Alcohol/week: 2.0 standard drinks      Types: 2 Glasses of wine per week      Binge frequency: Never    Drug use: N Asymptomatic, battery at end of life  Generator change    2. Essential hypertension Controlled       Plan:   Scheduled for a generator change.  Stable cardiac status.  Continue current medications.   FU After procedure.  Thank you for allowing us to participate in the care of your patient.     Fior Tee MD, FACC, [facility]        Please note that portions of this note may have been completed with a voice recognition program. Efforts were made to edit the dictations, but occasionally words are mistranscribed.     is no tenderness. No hernia. Musculoskeletal: Normal range of motion. General: No edema. Lymphadenopathy:     She has no cervical adenopathy. Neurological: She is alert and oriented to person, place, and time. Skin: Skin is warm and dry.  No additional surgical intervention. The patient voiced understanding and agreed to proceed.          Lin Osborne MD

## 2020-08-12 NOTE — TELEPHONE ENCOUNTER
Called pt due to [facility] home monitor didn't send in scheduled reading.  Spoke to daughter and Mrs Cline is at her other daughter's house for awhile and they forgot to take the monitor there.

## (undated) DEVICE — LEX CATH LAB MINOR: Brand: MEDLINE INDUSTRIES, INC.

## (undated) DEVICE — ARM SLING II: Brand: DEROYAL

## (undated) DEVICE — ST INF PRI SMRTSTE 20DRP 2VLV 24ML 117

## (undated) DEVICE — 3M™ TEGADERM™ CHG DRESSING 25/CARTON 4 CARTONS/CASE 1659: Brand: TEGADERM™

## (undated) DEVICE — MSK O2 VENTI SGL DL LF A/ 7FT